# Patient Record
Sex: FEMALE | NOT HISPANIC OR LATINO | URBAN - METROPOLITAN AREA
[De-identification: names, ages, dates, MRNs, and addresses within clinical notes are randomized per-mention and may not be internally consistent; named-entity substitution may affect disease eponyms.]

---

## 2017-11-30 ENCOUNTER — APPOINTMENT (OUTPATIENT)
Dept: WOMENS IMAGING | Facility: HOSPITAL | Age: 42
End: 2017-11-30

## 2017-11-30 PROCEDURE — 77067 SCR MAMMO BI INCL CAD: CPT | Performed by: RADIOLOGY

## 2017-11-30 PROCEDURE — 77063 BREAST TOMOSYNTHESIS BI: CPT | Performed by: RADIOLOGY

## 2018-12-04 ENCOUNTER — APPOINTMENT (OUTPATIENT)
Dept: WOMENS IMAGING | Facility: HOSPITAL | Age: 43
End: 2018-12-04

## 2018-12-04 PROCEDURE — 77067 SCR MAMMO BI INCL CAD: CPT | Performed by: RADIOLOGY

## 2019-12-10 ENCOUNTER — APPOINTMENT (OUTPATIENT)
Dept: WOMENS IMAGING | Facility: HOSPITAL | Age: 44
End: 2019-12-10

## 2019-12-10 PROCEDURE — 77067 SCR MAMMO BI INCL CAD: CPT | Performed by: RADIOLOGY

## 2019-12-10 PROCEDURE — 77063 BREAST TOMOSYNTHESIS BI: CPT | Performed by: RADIOLOGY

## 2020-12-16 ENCOUNTER — APPOINTMENT (OUTPATIENT)
Dept: WOMENS IMAGING | Facility: HOSPITAL | Age: 45
End: 2020-12-16

## 2020-12-16 PROCEDURE — 77067 SCR MAMMO BI INCL CAD: CPT | Performed by: RADIOLOGY

## 2020-12-16 PROCEDURE — 77063 BREAST TOMOSYNTHESIS BI: CPT | Performed by: RADIOLOGY

## 2022-01-04 ENCOUNTER — APPOINTMENT (OUTPATIENT)
Dept: WOMENS IMAGING | Facility: HOSPITAL | Age: 47
End: 2022-01-04

## 2022-01-04 PROCEDURE — 77067 SCR MAMMO BI INCL CAD: CPT | Performed by: RADIOLOGY

## 2022-01-04 PROCEDURE — 77063 BREAST TOMOSYNTHESIS BI: CPT | Performed by: RADIOLOGY

## 2025-04-18 ENCOUNTER — TELEPHONE (OUTPATIENT)
Dept: SURGERY | Facility: CLINIC | Age: 50
End: 2025-04-18
Payer: COMMERCIAL

## 2025-04-29 ENCOUNTER — OFFICE VISIT (OUTPATIENT)
Dept: SURGERY | Facility: CLINIC | Age: 50
End: 2025-04-29
Payer: COMMERCIAL

## 2025-04-29 VITALS
HEART RATE: 86 BPM | OXYGEN SATURATION: 98 % | DIASTOLIC BLOOD PRESSURE: 70 MMHG | SYSTOLIC BLOOD PRESSURE: 124 MMHG | BODY MASS INDEX: 28 KG/M2 | HEIGHT: 66 IN | WEIGHT: 174.2 LBS

## 2025-04-29 DIAGNOSIS — Z80.3 FAMILY HISTORY OF BREAST CANCER: ICD-10-CM

## 2025-04-29 DIAGNOSIS — Z91.89 AT HIGH RISK FOR BREAST CANCER: Primary | ICD-10-CM

## 2025-04-29 RX ORDER — CETIRIZINE HYDROCHLORIDE 10 MG/1
10 TABLET ORAL DAILY
COMMUNITY

## 2025-04-29 RX ORDER — IBUPROFEN 200 MG
400 TABLET ORAL
COMMUNITY

## 2025-04-29 NOTE — PROGRESS NOTES
[facility]     Referring Provider: Dr. Megan Potter     Chief complaint: management of breast cancer risk and family history breast cancer     Subjective    HPI: Ms. Claudette Lambert is a 50 yo woman, seen at the request of Dr. Megan Potter, for increased risk of breast cancer based on family history.     No prior breast biopsies.     She has a family history of breast cancer in her paternal aunt dx age 40, paternal aunt dx age 50, paternal aunt dx age 50, mother dx age 55 and 72.  She denies any family history of ovarian cancer or pancreatic cancer.     Today she presents with concerns for being high risk for breast cancer. She believes her mother had negative genetics and does not feel her paternal aunts had any genetic testing done.   She denies any breast lumps, pain, skin changes, or nipple discharge.  She denies any prior history of abnormal mammograms or breast biopsies.       Review of Systems   Constitutional:  Negative for appetite change, fatigue and unexpected weight change.   Respiratory:  Negative for cough and shortness of breath.    Musculoskeletal: Negative.    Neurological:  Negative for headaches.       Medications:    Current Outpatient Medications:     cetirizine (zyrTEC) 10 MG tablet, Take 1 tablet by mouth Daily., Disp: , Rfl:     ibuprofen (ADVIL,MOTRIN) 200 MG tablet, Take 2 tablets by mouth., Disp: , Rfl:     MAGNESIUM GLYCINATE PO, Take  by mouth., Disp: , Rfl:     Allergies:  No Known Allergies    Medical history:  History reviewed. No pertinent past medical history.    Surgical History:  Past Surgical History:   Procedure Laterality Date    TONSILLECTOMY         Family History:  Family History   Problem Relation Age of Onset    Breast cancer Mother     Breast cancer Paternal Aunt     Breast cancer Paternal Aunt     Breast cancer Paternal Aunt        Social History:   Social History     Socioeconomic History    Marital status: Unknown   Tobacco Use    Smoking status: Former     Types:  Cigarettes     Start date:      Quit date:      Years since quittin.3     Passive exposure: Never    Smokeless tobacco: Never   Vaping Use    Vaping status: Former    Start date: 1990    Quit date: 2004     Patient drinks 2 servings of caffeine per day.    Her occupation is   She lives with 4 people.        GYNECOLOGIC HISTORY:   . P: 2. AB: 2.  Last menstrual period: 2025  Age at menarche: 12  Age at first childbirth: 30  Lactation/How lon weeks  Age at menopause: N/A  Total years of oral contraceptive use: 10+ years  Total years of hormone replacement therapy: N/A    Objective   Physical Exam  Vitals:    25 1401   BP: 124/70   Pulse: 86   SpO2: 98%     ECOG 0 - Asymptomatic  General: NAD, well appearing  Psych: a&o x 3, normal mood and affect  Lymph nodes: no cervical, supraclavicular or axillary lymphadenopathy  Breast: symmetric, large, bra size 38D  Right: No visible abnormalities on inspection while seated, with arms raised or hands on hips. No masses, skin changes, or nipple abnormalities.  Left: No visible abnormalities on inspection while seated, with arms raised or hands on hips. No masses, skin changes, or nipple abnormalities.    Imaging/Pathology:   2024 bilateral screening mammogram at all women  Breast are heterogeneously dense  There are no new suspicious masses, calcifications or areas of architectural distortion  Impression: No mammographic evidence of malignancy  Screening mammogram 1 year is recommended  BI-RADS 1    2025 bilateral screening mammogram at all women  The breasts are heterogeneously dense, which may obscure small masses.  No suspicious masses, significant calcifications or other abnormalities are seen.  IMPRESSION:  There is no mammographic evidence of malignancy.  Screening mammogram in 1 year is recommended.  BI-RADS Category 1: Negative      Assessment & Plan   Assessment:    Family history of breast  cancer  Increased lifetime risk of breast cancer-according to TC 8 she has a lifetime risk of 42.5%, according to Kaelyn model she has a 5 year risk of 2% (calculated 4/29/2025)     Discussion:  We discussed management options for individuals who are at increased risk (>20% lifetime risk):  1) High risk screening - Annual mammogram and annual breast MRI, alternating one test every 6 months, biannual clinical exam and monthly self breast exam. She meets criteria for high risk screening.  2) Chemoprevention with Tamoxifen, Raloxifene or Exemestane. These may reduce risk up to 50%. I reviewed that these particular medications are not without risks and the risk/benefit ratio must be considered carefully. She is not interested in this currently.  3) Risk reducing surgery such as prophylactic mastectomy  which may reduce risk by 90-95%. We discussed that this is a relatively radical strategy and is generally reserved for individuals with a known genetic mutation predisposing them to an increased risk (~50% risk) of breast cancer. She does not meet criteria for risk reducing surgery.   4) I discussed the importance of exercise and weight management as part of a risk reducing strategy, since increased BMI is associated with an increased risk of breast cancer.      Plan:  - MRI in October  - Follow-up in October after MRI  -Patient wishes to consider genetic testing, we will discuss again at her next visit. She is aware to call the office if she wishes to proceed prior to her appointment.   -Declines medical oncology at this time.     I have advised the patient to continue monthly breast self examination and to return to see me in months after completion of imaging.  She was also advised to notify us if she develops new breast symptoms.       AJ Garcia    I spent 45 total minutes, face-to-face, chart review and caring for Claudette today. This time includes time spent by me in the following activities: preparing for  the visit, performing a medically appropriate examination and/or evaluation, counseling and educating the patient/family/caregiver, ordering medications, tests, or procedures, documenting information in the medical record and independently interpreting results and communicating that information with the patient/family/caregiver.    CC:  MD Deacon Blanton Dorothy, MD    EMR Dragon/transcription disclaimer:  Dictated using Dragon dictation

## 2025-04-29 NOTE — LETTER
April 29, 2025     Megan HAM MD  [address]  [facility] [address]    Patient: Claudette Lambert   YOB: 1975   Date of Visit: 4/29/2025     Dear Megan HAM MD:       Thank you for referring Claudette Lambert to me for evaluation. Below are the relevant portions of my assessment and plan of care.    If you have questions, please do not hesitate to call me. I look forward to following Claudette along with you.         Sincerely,        AJ Garcia        CC: MD Chan Hernandez Tracey, APRN  04/29/25 1543  Sign when Signing Visit  [facility]     Referring Provider: Dr. Megan Potter     Chief complaint: management of breast cancer risk and family history breast cancer     Subjective   HPI: Ms. Claudette Lambert is a 50 yo woman, seen at the request of Dr. Megan Potter, for increased risk of breast cancer based on family history.     No prior breast biopsies.     She has a family history of breast cancer in her paternal aunt dx age 40, paternal aunt dx age 50, paternal aunt dx age 50, mother dx age 55 and 72.  She denies any family history of ovarian cancer or pancreatic cancer.     Today she presents with concerns for being high risk for breast cancer. She believes her mother had negative genetics and does not feel her paternal aunts had any genetic testing done.   She denies any breast lumps, pain, skin changes, or nipple discharge.  She denies any prior history of abnormal mammograms or breast biopsies.       Review of Systems   Constitutional:  Negative for appetite change, fatigue and unexpected weight change.   Respiratory:  Negative for cough and shortness of breath.    Musculoskeletal: Negative.    Neurological:  Negative for headaches.       Medications:    Current Outpatient Medications:   •  cetirizine (zyrTEC) 10 MG tablet, Take 1 tablet by mouth Daily., Disp: , Rfl:   •  ibuprofen (ADVIL,MOTRIN) 200 MG tablet, Take 2 tablets by mouth., Disp: , Rfl:   •  MAGNESIUM  GLYCINATE PO, Take  by mouth., Disp: , Rfl:     Allergies:  No Known Allergies    Medical history:  History reviewed. No pertinent past medical history.    Surgical History:  Past Surgical History:   Procedure Laterality Date   • TONSILLECTOMY         Family History:  Family History   Problem Relation Age of Onset   • Breast cancer Mother    • Breast cancer Paternal Aunt    • Breast cancer Paternal Aunt    • Breast cancer Paternal Aunt        Social History:   Social History     Socioeconomic History   • Marital status: Unknown   Tobacco Use   • Smoking status: Former     Types: Cigarettes     Start date:      Quit date:      Years since quittin.3     Passive exposure: Never   • Smokeless tobacco: Never   Vaping Use   • Vaping status: Former   • Start date: 1990   • Quit date: 2004     Patient drinks 2 servings of caffeine per day.    Her occupation is   She lives with 4 people.        GYNECOLOGIC HISTORY:   . P: 2. AB: 2.  Last menstrual period: 2025  Age at menarche: 12  Age at first childbirth: 30  Lactation/How lon weeks  Age at menopause: N/A  Total years of oral contraceptive use: 10+ years  Total years of hormone replacement therapy: N/A    Objective  Physical Exam  Vitals:    25 1401   BP: 124/70   Pulse: 86   SpO2: 98%     ECOG 0 - Asymptomatic  General: NAD, well appearing  Psych: a&o x 3, normal mood and affect  Lymph nodes: no cervical, supraclavicular or axillary lymphadenopathy  Breast: symmetric, large, bra size 38D  Right: No visible abnormalities on inspection while seated, with arms raised or hands on hips. No masses, skin changes, or nipple abnormalities.  Left: No visible abnormalities on inspection while seated, with arms raised or hands on hips. No masses, skin changes, or nipple abnormalities.    Imaging/Pathology:   2024 bilateral screening mammogram at all women  Breast are heterogeneously dense  There are no new suspicious  masses, calcifications or areas of architectural distortion  Impression: No mammographic evidence of malignancy  Screening mammogram 1 year is recommended  BI-RADS 1    4/9/2025 bilateral screening mammogram at all women  The breasts are heterogeneously dense, which may obscure small masses.  No suspicious masses, significant calcifications or other abnormalities are seen.  IMPRESSION:  There is no mammographic evidence of malignancy.  Screening mammogram in 1 year is recommended.  BI-RADS Category 1: Negative      Assessment & Plan  Assessment:    Family history of breast cancer  Increased lifetime risk of breast cancer-according to TC 8 she has a lifetime risk of 42.5%, according to Kaelyn model she has a 5 year risk of 2% (calculated 4/29/2025)     Discussion:  We discussed management options for individuals who are at increased risk (>20% lifetime risk):  1) High risk screening - Annual mammogram and annual breast MRI, alternating one test every 6 months, biannual clinical exam and monthly self breast exam. She meets criteria for high risk screening.  2) Chemoprevention with Tamoxifen, Raloxifene or Exemestane. These may reduce risk up to 50%. I reviewed that these particular medications are not without risks and the risk/benefit ratio must be considered carefully. She is not interested in this currently.  3) Risk reducing surgery such as prophylactic mastectomy  which may reduce risk by 90-95%. We discussed that this is a relatively radical strategy and is generally reserved for individuals with a known genetic mutation predisposing them to an increased risk (~50% risk) of breast cancer. She does not meet criteria for risk reducing surgery.   4) I discussed the importance of exercise and weight management as part of a risk reducing strategy, since increased BMI is associated with an increased risk of breast cancer.      Plan:  - MRI in October  - Follow-up in October after MRI  -Patient wishes to consider genetic  testing, we will discuss again at her next visit. She is aware to call the office if she wishes to proceed prior to her appointment.   -Declines medical oncology at this time.     I have advised the patient to continue monthly breast self examination and to return to see me in months after completion of imaging.  She was also advised to notify us if she develops new breast symptoms.       AJ Garcia    I spent 45 total minutes, face-to-face, chart review and caring for Claudette today. This time includes time spent by me in the following activities: preparing for the visit, performing a medically appropriate examination and/or evaluation, counseling and educating the patient/family/caregiver, ordering medications, tests, or procedures, documenting information in the medical record and independently interpreting results and communicating that information with the patient/family/caregiver.    CC:  MD Deacon Blanton Dorothy, MD    EMR Dragon/transcription disclaimer:  Dictated using Dragon dictation

## 2025-05-02 ENCOUNTER — PATIENT ROUNDING (BHMG ONLY) (OUTPATIENT)
Dept: SURGERY | Facility: CLINIC | Age: 50
End: 2025-05-02
Payer: COMMERCIAL

## 2025-05-02 NOTE — PROGRESS NOTES
May 2, 2025    Hello, may I speak with Claudette Lambert?    My name is Lo      I am  with [facility]  [address] [address] [phone].    Before we get started may I verify your date of birth? 1975    I am calling to officially welcome you to our practice and ask about your recent visit. Is this a good time to talk? yes    Tell me about your visit with us. What things went well? The visit was fine. I was seen promptly and didn't have to wait long.       We're always looking for ways to make our patients' experiences even better. Do you have recommendations on ways we may improve?  no    Overall were you satisfied with your first visit to our practice? yes       I appreciate you taking the time to speak with me today. Is there anything else I can do for you? no      Thank you, and have a great day.